# Patient Record
Sex: MALE | Race: WHITE
[De-identification: names, ages, dates, MRNs, and addresses within clinical notes are randomized per-mention and may not be internally consistent; named-entity substitution may affect disease eponyms.]

---

## 2019-08-07 ENCOUNTER — HOSPITAL ENCOUNTER (EMERGENCY)
Dept: HOSPITAL 25 - UCCORT | Age: 24
Discharge: HOME | End: 2019-08-07
Payer: COMMERCIAL

## 2019-08-07 VITALS — DIASTOLIC BLOOD PRESSURE: 79 MMHG | SYSTOLIC BLOOD PRESSURE: 142 MMHG
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
## 2019-08-07 DIAGNOSIS — S61.012A: Primary | ICD-10-CM
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
## 2019-08-07 PROCEDURE — 99201: CPT

## 2019-08-07 PROCEDURE — G0463 HOSPITAL OUTPT CLINIC VISIT: HCPCS

## 2019-08-07 PROCEDURE — 12001 RPR S/N/AX/GEN/TRNK 2.5CM/<: CPT

## 2019-08-07 PROCEDURE — 90715 TDAP VACCINE 7 YRS/> IM: CPT

## 2019-08-07 NOTE — UC
Laceration HPI





- HPI Summary


HPI Summary: 





Pt presents with c/o of laceration to base of left thumb.  Pt was routing a 

boat part with metal instrument. 





- History Of Current Complaint


Chief Complaint: UCLaceration


Stated Complaint: LEFT HAND INJURY (WC)


Time Seen by Provider: 08/07/19 14:43


Hx Obtained From: Patient


Laceration Location: Hand


Mechanism Of Injury: Sharp Trauma


Onset/Duration: Sudden Onset


Severity: Mild


Pain Intensity: 1


Aggravating Factors: Position, Movement


Related History: Dominant Hand Right





- Allergies/Home Medications


Allergies/Adverse Reactions: 


 Allergies











Allergy/AdvReac Type Severity Reaction Status Date / Time


 


grass,pollen Allergy  Congestion Uncoded 08/07/19 14:36











Home Medications: 


 Home Medications





NK [No Home Medications Reported]  08/07/19 [History Confirmed 08/07/19]











PMH/Surg Hx/FS Hx/Imm Hx


Previously Healthy: Yes





- Surgical History


Surgical History: Yes


Surgery Procedure, Year, and Place: adenoids as young child; left arm MRSA ~2014





- Family History


Known Family History: Positive: Unknown





- Social History


Occupation: Employed Full-time


Lives: With Family


Alcohol Use: None


Substance Use Type: None


Smoking Status (MU): Heavy Every Day Tobacco Smoker


Have You Smoked in the Last Year: No





- Immunization History


Most Recent Tetanus Shot: UNKNOWN


Vaccination Up to Date: No





Review of Systems


All Other Systems Reviewed And Are Negative: Yes


Constitutional: Positive: Negative


Skin: Positive: Other - laceration left hand


Eyes: Positive: Negative


ENT: Positive: Negative


Respiratory: Positive: Negative


Cardiovascular: Positive: Negative


Gastrointestinal: Positive: Negative


Genitourinary: Positive: Negative


Motor: Positive: Negative


Neurovascular: Positive: Negative


Musculoskeletal: Positive: Negative


Neurological: Positive: Negative


Psychological: Positive: Negative


Is Patient Immunocompromised?: No





Physical Exam


Triage Information Reviewed: Yes


Appearance: Well-Appearing


Vital Signs: 


 Initial Vital Signs











Temp  98.2 F   08/07/19 14:38


 


Pulse  74   08/07/19 14:38


 


Resp  18   08/07/19 14:38


 


BP  142/79   08/07/19 14:38


 


Pulse Ox  99   08/07/19 14:38











Vital Signs Reviewed: Yes


Eye Exam: Normal


ENT Exam: Normal


Dental Exam: Normal


Neck exam: Normal


Respiratory: Positive: No respiratory distress


Musculoskeletal Exam: Normal


Musculoskeletal: Positive: Strength Intact, ROM Intact


Neurological Exam: Normal


Psychological Exam: Normal


Skin Exam: Other - laceration base of left thumb





Laceration Repair





- Laceration Repair


  ** 1


Description: Linear


Laceration Size After Repair: Length (cm) - 1, Width (mm) - 2, Depth (mm) - 2


Modified For Repair: No


Cleansing Completed Via Routine Prep: No


Irrigation With Pressure Irrigation Device: Yes


Closure Material: Skin Adhesive, SteriStrips


Closure Method: Single Layer


Suture Of: Skin





Laceration Course/Dx





- Differential Dx - Laceration/Wound


Differental Diagnoses: Laceration





- Diagnosis


Provider Diagnosis: 


 Laceration, Laceration of left thumb without complication








Discharge





- Sign-Out/Discharge


Documenting (check all that apply): Patient Departure


All imaging exams completed and their final reports reviewed: No Studies





- Discharge Plan


Condition: Stable


Disposition: HOME


Patient Education Materials:  Skin Adhesive Care (ED), Steristrips (ED)


Referrals: 


Nai Wyatt MD [Primary Care Provider] - If Needed





- Billing Disposition and Condition


Condition: STABLE


Disposition: Home